# Patient Record
Sex: FEMALE | Race: WHITE | NOT HISPANIC OR LATINO | Employment: UNEMPLOYED | URBAN - METROPOLITAN AREA
[De-identification: names, ages, dates, MRNs, and addresses within clinical notes are randomized per-mention and may not be internally consistent; named-entity substitution may affect disease eponyms.]

---

## 2020-07-22 DIAGNOSIS — Z11.59 ENCOUNTER FOR SCREENING FOR OTHER VIRAL DISEASES: Primary | ICD-10-CM

## 2020-08-11 ENCOUNTER — HOSPITAL ENCOUNTER (OUTPATIENT)
Dept: LAB | Facility: CLINIC | Age: 40
Discharge: HOME | End: 2020-08-11
Attending: OPHTHALMOLOGY | Admitting: OPHTHALMOLOGY
Payer: MEDICAID

## 2020-08-11 DIAGNOSIS — Z11.59 ENCOUNTER FOR SCREENING FOR OTHER VIRAL DISEASES: ICD-10-CM

## 2020-08-11 PROCEDURE — U0003 INFECTIOUS AGENT DETECTION BY NUCLEIC ACID (DNA OR RNA); SEVERE ACUTE RESPIRATORY SYNDROME CORONAVIRUS 2 (SARS-COV-2) (CORONAVIRUS DISEASE [COVID-19]), AMPLIFIED PROBE TECHNIQUE, MAKING USE OF HIGH THROUGHPUT TECHNOLOGIES AS DESCRIBED BY CMS-2020-01-R: HCPCS | Performed by: OPHTHALMOLOGY

## 2020-08-11 NOTE — PROGRESS NOTES
"Covid-19 PCR test completed. Right NP swabbed. Tolerated well. \"After Your Covid-19 Test\" Handout given to patient.    "

## 2020-08-12 LAB
SARS-COV-2 RNA SPEC QL NAA+PROBE: NOT DETECTED
SPECIMEN SOURCE: NORMAL

## 2020-08-13 RX ORDER — VENLAFAXINE HYDROCHLORIDE 37.5 MG/1
37.5 TABLET, EXTENDED RELEASE ORAL DAILY
COMMUNITY

## 2020-08-13 RX ORDER — SIMVASTATIN 20 MG
20 TABLET ORAL AT BEDTIME
COMMUNITY

## 2020-08-13 RX ORDER — NYSTATIN 100000 [USP'U]/G
POWDER TOPICAL
COMMUNITY

## 2020-08-13 RX ORDER — LORATADINE 10 MG/1
10 TABLET ORAL DAILY
COMMUNITY

## 2020-08-13 RX ORDER — POLYETHYLENE GLYCOL 3350 17 G/17G
17 POWDER, FOR SOLUTION ORAL DAILY
COMMUNITY

## 2020-08-13 RX ORDER — FLUTICASONE PROPIONATE 50 MCG
1 SPRAY, SUSPENSION (ML) NASAL DAILY
COMMUNITY

## 2020-08-13 RX ORDER — BUPROPION HYDROCHLORIDE 150 MG/1
150 TABLET ORAL EVERY MORNING
COMMUNITY

## 2020-08-14 ENCOUNTER — ANESTHESIA EVENT (OUTPATIENT)
Dept: SURGERY | Facility: CLINIC | Age: 40
End: 2020-08-14
Payer: MEDICAID

## 2020-08-14 ENCOUNTER — HOSPITAL ENCOUNTER (OUTPATIENT)
Facility: CLINIC | Age: 40
Discharge: HOME | End: 2020-08-14
Attending: OPHTHALMOLOGY | Admitting: OPHTHALMOLOGY
Payer: MEDICAID

## 2020-08-14 ENCOUNTER — AMBULATORY - HEALTHEAST (OUTPATIENT)
Dept: OTHER | Facility: CLINIC | Age: 40
End: 2020-08-14

## 2020-08-14 ENCOUNTER — DOCUMENTATION ONLY (OUTPATIENT)
Dept: OTHER | Facility: CLINIC | Age: 40
End: 2020-08-14

## 2020-08-14 ENCOUNTER — ANESTHESIA (OUTPATIENT)
Dept: SURGERY | Facility: CLINIC | Age: 40
End: 2020-08-14
Payer: MEDICAID

## 2020-08-14 VITALS
TEMPERATURE: 96.7 F | HEART RATE: 100 BPM | RESPIRATION RATE: 16 BRPM | SYSTOLIC BLOOD PRESSURE: 149 MMHG | WEIGHT: 233.4 LBS | BODY MASS INDEX: 39.85 KG/M2 | DIASTOLIC BLOOD PRESSURE: 93 MMHG | OXYGEN SATURATION: 97 % | HEIGHT: 64 IN

## 2020-08-14 LAB — HCG SERPL QL: NEGATIVE

## 2020-08-14 PROCEDURE — 36000056 ZZH SURGERY LEVEL 3 1ST 30 MIN: Performed by: OPHTHALMOLOGY

## 2020-08-14 PROCEDURE — 71000027 ZZH RECOVERY PHASE 2 EACH 15 MINS: Performed by: OPHTHALMOLOGY

## 2020-08-14 PROCEDURE — 25000128 H RX IP 250 OP 636: Performed by: NURSE ANESTHETIST, CERTIFIED REGISTERED

## 2020-08-14 PROCEDURE — 71000014 ZZH RECOVERY PHASE 1 LEVEL 2 FIRST HR: Performed by: OPHTHALMOLOGY

## 2020-08-14 PROCEDURE — 25000125 ZZHC RX 250: Performed by: NURSE ANESTHETIST, CERTIFIED REGISTERED

## 2020-08-14 PROCEDURE — 25000125 ZZHC RX 250: Performed by: OPHTHALMOLOGY

## 2020-08-14 PROCEDURE — 71000015 ZZH RECOVERY PHASE 1 LEVEL 2 EA ADDTL HR: Performed by: OPHTHALMOLOGY

## 2020-08-14 PROCEDURE — 25800030 ZZH RX IP 258 OP 636: Performed by: NURSE ANESTHETIST, CERTIFIED REGISTERED

## 2020-08-14 PROCEDURE — 36000058 ZZH SURGERY LEVEL 3 EA 15 ADDTL MIN: Performed by: OPHTHALMOLOGY

## 2020-08-14 PROCEDURE — 37000008 ZZH ANESTHESIA TECHNICAL FEE, 1ST 30 MIN: Performed by: OPHTHALMOLOGY

## 2020-08-14 PROCEDURE — 84703 CHORIONIC GONADOTROPIN ASSAY: CPT | Performed by: ANESTHESIOLOGY

## 2020-08-14 PROCEDURE — 27210794 ZZH OR GENERAL SUPPLY STERILE: Performed by: OPHTHALMOLOGY

## 2020-08-14 PROCEDURE — 36415 COLL VENOUS BLD VENIPUNCTURE: CPT | Performed by: ANESTHESIOLOGY

## 2020-08-14 PROCEDURE — 40000170 ZZH STATISTIC PRE-PROCEDURE ASSESSMENT II: Performed by: OPHTHALMOLOGY

## 2020-08-14 PROCEDURE — 37000009 ZZH ANESTHESIA TECHNICAL FEE, EACH ADDTL 15 MIN: Performed by: OPHTHALMOLOGY

## 2020-08-14 RX ORDER — FENTANYL CITRATE 50 UG/ML
25-50 INJECTION, SOLUTION INTRAMUSCULAR; INTRAVENOUS
Status: CANCELLED | OUTPATIENT
Start: 2020-08-14

## 2020-08-14 RX ORDER — FENTANYL CITRATE 0.05 MG/ML
25-50 INJECTION, SOLUTION INTRAMUSCULAR; INTRAVENOUS
Status: DISCONTINUED | OUTPATIENT
Start: 2020-08-14 | End: 2020-08-17 | Stop reason: HOSPADM

## 2020-08-14 RX ORDER — LIDOCAINE HYDROCHLORIDE 20 MG/ML
INJECTION, SOLUTION INFILTRATION; PERINEURAL PRN
Status: DISCONTINUED | OUTPATIENT
Start: 2020-08-14 | End: 2020-08-14

## 2020-08-14 RX ORDER — ONDANSETRON 2 MG/ML
INJECTION INTRAMUSCULAR; INTRAVENOUS PRN
Status: DISCONTINUED | OUTPATIENT
Start: 2020-08-14 | End: 2020-08-14

## 2020-08-14 RX ORDER — TETRACAINE HYDROCHLORIDE 5 MG/ML
SOLUTION OPHTHALMIC
Status: DISCONTINUED
Start: 2020-08-14 | End: 2020-08-14 | Stop reason: HOSPADM

## 2020-08-14 RX ORDER — FENTANYL CITRATE 50 UG/ML
INJECTION, SOLUTION INTRAMUSCULAR; INTRAVENOUS PRN
Status: DISCONTINUED | OUTPATIENT
Start: 2020-08-14 | End: 2020-08-14

## 2020-08-14 RX ORDER — NALOXONE HYDROCHLORIDE 0.4 MG/ML
.1-.4 INJECTION, SOLUTION INTRAMUSCULAR; INTRAVENOUS; SUBCUTANEOUS
Status: DISCONTINUED | OUTPATIENT
Start: 2020-08-14 | End: 2020-08-15 | Stop reason: HOSPADM

## 2020-08-14 RX ORDER — ERYTHROMYCIN 5 MG/G
OINTMENT OPHTHALMIC PRN
Status: DISCONTINUED | OUTPATIENT
Start: 2020-08-14 | End: 2020-08-14 | Stop reason: HOSPADM

## 2020-08-14 RX ORDER — LIDOCAINE 40 MG/G
CREAM TOPICAL
Status: DISCONTINUED | OUTPATIENT
Start: 2020-08-14 | End: 2020-08-14 | Stop reason: HOSPADM

## 2020-08-14 RX ORDER — ONDANSETRON 4 MG/1
4 TABLET, ORALLY DISINTEGRATING ORAL EVERY 30 MIN PRN
Status: DISCONTINUED | OUTPATIENT
Start: 2020-08-14 | End: 2020-08-17 | Stop reason: HOSPADM

## 2020-08-14 RX ORDER — ERYTHROMYCIN 5 MG/G
OINTMENT OPHTHALMIC
Status: DISCONTINUED
Start: 2020-08-14 | End: 2020-08-14 | Stop reason: HOSPADM

## 2020-08-14 RX ORDER — LIDOCAINE HCL/EPINEPHRINE/PF 2%-1:200K
VIAL (ML) INJECTION PRN
Status: DISCONTINUED | OUTPATIENT
Start: 2020-08-14 | End: 2020-08-14 | Stop reason: HOSPADM

## 2020-08-14 RX ORDER — DEXAMETHASONE SODIUM PHOSPHATE 4 MG/ML
INJECTION, SOLUTION INTRA-ARTICULAR; INTRALESIONAL; INTRAMUSCULAR; INTRAVENOUS; SOFT TISSUE PRN
Status: DISCONTINUED | OUTPATIENT
Start: 2020-08-14 | End: 2020-08-14

## 2020-08-14 RX ORDER — PROPOFOL 10 MG/ML
INJECTION, EMULSION INTRAVENOUS CONTINUOUS PRN
Status: DISCONTINUED | OUTPATIENT
Start: 2020-08-14 | End: 2020-08-14

## 2020-08-14 RX ORDER — SODIUM CHLORIDE, SODIUM LACTATE, POTASSIUM CHLORIDE, CALCIUM CHLORIDE 600; 310; 30; 20 MG/100ML; MG/100ML; MG/100ML; MG/100ML
INJECTION, SOLUTION INTRAVENOUS CONTINUOUS PRN
Status: DISCONTINUED | OUTPATIENT
Start: 2020-08-14 | End: 2020-08-14

## 2020-08-14 RX ORDER — MEPERIDINE HYDROCHLORIDE 25 MG/ML
12.5 INJECTION INTRAMUSCULAR; INTRAVENOUS; SUBCUTANEOUS
Status: DISCONTINUED | OUTPATIENT
Start: 2020-08-14 | End: 2020-08-17 | Stop reason: HOSPADM

## 2020-08-14 RX ORDER — HYDROMORPHONE HYDROCHLORIDE 1 MG/ML
.3-.5 INJECTION, SOLUTION INTRAMUSCULAR; INTRAVENOUS; SUBCUTANEOUS EVERY 10 MIN PRN
Status: DISCONTINUED | OUTPATIENT
Start: 2020-08-14 | End: 2020-08-17 | Stop reason: HOSPADM

## 2020-08-14 RX ORDER — ONDANSETRON 2 MG/ML
4 INJECTION INTRAMUSCULAR; INTRAVENOUS EVERY 30 MIN PRN
Status: DISCONTINUED | OUTPATIENT
Start: 2020-08-14 | End: 2020-08-17 | Stop reason: HOSPADM

## 2020-08-14 RX ORDER — PROPOFOL 10 MG/ML
INJECTION, EMULSION INTRAVENOUS PRN
Status: DISCONTINUED | OUTPATIENT
Start: 2020-08-14 | End: 2020-08-14

## 2020-08-14 RX ORDER — LIDOCAINE HCL/EPINEPHRINE/PF 2%-1:200K
VIAL (ML) INJECTION
Status: DISCONTINUED
Start: 2020-08-14 | End: 2020-08-14 | Stop reason: HOSPADM

## 2020-08-14 RX ORDER — SODIUM CHLORIDE, SODIUM LACTATE, POTASSIUM CHLORIDE, CALCIUM CHLORIDE 600; 310; 30; 20 MG/100ML; MG/100ML; MG/100ML; MG/100ML
INJECTION, SOLUTION INTRAVENOUS CONTINUOUS
Status: DISCONTINUED | OUTPATIENT
Start: 2020-08-14 | End: 2020-08-17 | Stop reason: HOSPADM

## 2020-08-14 RX ADMIN — DEXAMETHASONE SODIUM PHOSPHATE 4 MG: 4 INJECTION, SOLUTION INTRA-ARTICULAR; INTRALESIONAL; INTRAMUSCULAR; INTRAVENOUS; SOFT TISSUE at 08:06

## 2020-08-14 RX ADMIN — FENTANYL CITRATE 50 MCG: 50 INJECTION, SOLUTION INTRAMUSCULAR; INTRAVENOUS at 07:50

## 2020-08-14 RX ADMIN — FENTANYL CITRATE 25 MCG: 50 INJECTION, SOLUTION INTRAMUSCULAR; INTRAVENOUS at 09:10

## 2020-08-14 RX ADMIN — LIDOCAINE HYDROCHLORIDE 60 MG: 20 INJECTION, SOLUTION INFILTRATION; PERINEURAL at 07:50

## 2020-08-14 RX ADMIN — FENTANYL CITRATE 25 MCG: 50 INJECTION, SOLUTION INTRAMUSCULAR; INTRAVENOUS at 09:40

## 2020-08-14 RX ADMIN — PROPOFOL 200 MG: 10 INJECTION, EMULSION INTRAVENOUS at 07:50

## 2020-08-14 RX ADMIN — MIDAZOLAM 1 MG: 1 INJECTION INTRAMUSCULAR; INTRAVENOUS at 07:46

## 2020-08-14 RX ADMIN — SODIUM CHLORIDE, POTASSIUM CHLORIDE, SODIUM LACTATE AND CALCIUM CHLORIDE: 600; 310; 30; 20 INJECTION, SOLUTION INTRAVENOUS at 07:45

## 2020-08-14 RX ADMIN — PHENYLEPHRINE HYDROCHLORIDE 150 MCG: 10 INJECTION INTRAVENOUS at 07:54

## 2020-08-14 RX ADMIN — PROPOFOL 180 MCG/KG/MIN: 10 INJECTION, EMULSION INTRAVENOUS at 07:54

## 2020-08-14 RX ADMIN — ONDANSETRON 4 MG: 2 INJECTION INTRAMUSCULAR; INTRAVENOUS at 09:12

## 2020-08-14 ASSESSMENT — LIFESTYLE VARIABLES: TOBACCO_USE: 0

## 2020-08-14 ASSESSMENT — ENCOUNTER SYMPTOMS: DYSRHYTHMIAS: 0

## 2020-08-14 ASSESSMENT — MIFFLIN-ST. JEOR: SCORE: 1708.7

## 2020-08-14 NOTE — OP NOTE
Procedure Date: 08/14/2020      PROCEDURES:   1.  Repair of bilateral upper eyelid ptosis.   2.  Repair of bilateral upper eyelid mechanical ptosis.   3.  Bilateral upper eyelid wedge excision.   4.  Repair of bilateral eyebrow ptosis.    5.  Repair of bilateral lower eyelid ectropion.       PREOPERATIVE DIAGNOSES:    1.  Bilateral upper eyelid ptosis.   2.  Bilateral upper eyelid mechanical ptosis.    3.  Bilateral eyebrow ptosis.    4.  Bilateral upper eyelid floppy eyelid syndrome.    5.  Bilateral lower eyelid ectropion.      SURGEON:  Logan Lopez MD.      ANESTHESIA:  Local monitored.      COMPLICATIONS:  None.      INDICATION FOR SURGERY:  The patient has myotonic dystrophy and had severe ptosis and mechanical ptosis of both upper eyelids.  Additionally, the patient had marked eyebrow ptosis with the eyebrows resting well below the orbital rim.  Patient had horizontal laxity of both the upper and lower eyelids, resulting in chronic tearing.  The patient presents for surgery because of visual obstruction due to her ptosis, mechanical ptosis and eyebrow ptosis and due to chronic tearing due to her floppy eyelid syndrome and bilateral lower eyelid ectropion.      PROCEDURE:  The patient was taken to the operating room and received a local block of 2% Lidocaine without epinephrine.  The block was administered transcutaneously along the eyelid crease and the planned incision line was outlined with a marking pen.  The patient was then prepped and draped in the usual sterile fashion.  The incision was then made along the previously marked area.  A moderate amount of skin was excised taking care to allow adequate closure and avoid lagophthalmos. Lauren scissors were then used to develop a plane over the septum.  The septum was opened and a small amount of orbital fat was removed.  Levator aponeurosis was then disinserted from the tarsus and a plane was developed between the aponeurosis and the underlying tarsus and  Hammonds's muscle.  A 5-0 Mersilene suture was then passed through the tarsus in a lamellar fashion and externalized through the levator aponeurosis.  This was tied off in a temporary fashion and the patient was asked to sit up and the eyelids height and contour evaluated.  This was repeated until a satisfactory height and contour were obtained, and two additional sutures were placed lateral to the initial suture.  This resulted in a normal contour and height to the bilateral upper eyelids.  Attempted overcorrection of 0.5 mm was obtained.  The redundant levator aponeurosis was then excised and the skin was then closed with running 6-0 fast absorbing suture.  The patient left the operating room in stable condition.      The patient was taken into the operating room and the planned excision was outlined along the upper aspect of both eyebrows. The eyebrows were then injected with 2% lidocaine with 1:100,000 epinephrine. The patient was prepped and draped in the usual sterile fashion. An ellipse of skin and subcutaneous tissue was excised with a #15 blade and cutting cautery. Care was taken to allow for good eyelid closure. Hemostasis was obtained with the [facility] cautery. Deep tissues were then closed with interrupted 5-0 Vicryl suture. The skin was closed with running 6-0 nylon suture, taking care to israel the skin margins. The patient left the operating room in stable condition.      A canthotomy was performed and the inferior limb of lateral canthal tendon was severed.  The tarsal strip was shortened and a small strip of eyelashes was excised.  A tongue of tarsus was developed and then the orbital rim was exposed with cutting cautery.  A double armed 5-0 Prolene suture was then used to suspend the tarsus from the internal aspect of the orbital rim.  This was tied off in a permanent fashion and the overlying skin was closed with an interrupted 6-0 fast absorbing gut suture.  The opposite side was done in an  identical manner.  At the end of the procedure the patient had a normal contour to both lower eyelids.  The patient left the operating room in stable condition.      During the upper eyelid surgery, a full thickness wedge was removed in the lateral aspect of both upper eyelids measuring approximately 30% of each upper eyelid.  This was closed using 5-0 Vicryl suture at the level of the tarsal plate and then closing the eyelid margin with 6-0 chromic at the gray line, lash line and pretarsal skin.  A similar 30% excision was performed on each side.  The patient tolerated the procedure well.         MARGARITA CASEY MD             D: 2020   T: 2020   MT: DAVE      Name:     VALENTINE LUJAN   MRN:      [id number]        Account:        [id number]   :      1980           Procedure Date: 2020      Document: [id number]

## 2020-08-14 NOTE — OP NOTE
[facility] Ophthalmology Brief Operative Note    Pre-operative diagnosis: Mechanical ptosis of eyelid of both eyes [H02.413]  Bilateral upper eyelid ptosis  Bilateral upper eyelid floppy eyelid syndrome  Bilateral lower eyelid ectropion  Bilateral eyebrow ptosis   Post-operative diagnosis: Same   Procedure: Repair bilateral upper eyelid ptosis  Repair bilateral upper eyelid mechanical ptosis  Bilateral upper eyelid wedge excision  Bilateral lower eyelid ectropion repair  Bilateral eyebrow ptosis repair   Surgeon: Logan Lopez MD       Anesthesia: MAC   Estimated blood loss: Less than 10 ml   Total IV fluids: (See anesthesia record)   Blood transfusion: No transfusion was given during surgery   Total urine output: (See anesthesia record)   Drains: None   Specimens: None   Implants: None   Findings: See operative note   Complications: None   Condition: Stable   Comments: See dictated operative report for full details     Logan Lopez MD

## 2020-08-14 NOTE — ANESTHESIA PREPROCEDURE EVALUATION
Anesthesia Pre-Procedure Evaluation    Patient: Jacquelin Buckley   MRN: [id number] : 1980          Preoperative Diagnosis: Mechanical ptosis of eyelid of both eyes [H02.413]    Procedure(s):  BILATERAL UPPER LID PTOSIS AND  MECHANICAL PTOSIS REPAIR  BILATERAL BROW PTOSIS REPAIR  BILATERAL LOWER LID ECTROPION REPAIR    Past Medical History:   Diagnosis Date     Cerebral palsy (H)      Depressive disorder      Dermatochalasis of both eyelids      Eczema      Muscular dystrophy (H)      Pseudophakia      Ptosis      Sleep apnea      Past Surgical History:   Procedure Laterality Date     CHOLECYSTECTOMY       EYE SURGERY      cataract      ORTHOPEDIC SURGERY      Heel cord surgery        Anesthesia Evaluation     .             ROS/MED HX    ENT/Pulmonary:     (+)sleep apnea, , . .   (-) tobacco use and asthma   Neurologic:     (+)other neuro cerebral palsy; uses leg braces    Cardiovascular:     (+) Dyslipidemia, ----. : . . . :. .      (-) hypertension and arrhythmias   METS/Exercise Tolerance:     Hematologic:         Musculoskeletal:   (+) Muscular Dystrophy,  -       GI/Hepatic:        (-) GERD and liver disease   Renal/Genitourinary:      (-) renal disease   Endo:     (+) Obesity, .   (-) Type II DM and thyroid disease   Psychiatric:     (+) psychiatric history depression      Infectious Disease:         Malignancy:         Other:                          Physical Exam  Normal systems: dental    Airway   Mallampati: II  TM distance: >3 FB  Neck ROM: full    Dental     Cardiovascular   Rhythm and rate: regular      Pulmonary             No results found for: WBC, HGB, HCT, PLT, CRP, SED, NA, POTASSIUM, CHLORIDE, CO2, BUN, CR, GLC, BIMAL, PHOS, MAG, ALBUMIN, PROTTOTAL, ALT, AST, GGT, ALKPHOS, BILITOTAL, BILIDIRECT, LIPASE, AMYLASE, HAYDEN, PTT, INR, FIBR, TSH, T4, T3, HCG, HCGS, CKTOTAL, CKMB, TROPN    Preop Vitals  BP Readings from Last 3 Encounters:   20 130/84    Pulse Readings from Last 3 Encounters:  "  08/14/20 100      Resp Readings from Last 3 Encounters:   08/14/20 16    SpO2 Readings from Last 3 Encounters:   08/14/20 94%      Temp Readings from Last 1 Encounters:   08/14/20 36.3  C (97.3  F) (Temporal)    Ht Readings from Last 1 Encounters:   08/14/20 1.626 m (5' 4\")      Wt Readings from Last 1 Encounters:   08/14/20 105.9 kg (233 lb 6.4 oz)    Estimated body mass index is 40.06 kg/m  as calculated from the following:    Height as of this encounter: 1.626 m (5' 4\").    Weight as of this encounter: 105.9 kg (233 lb 6.4 oz).       Anesthesia Plan      History & Physical Review  History and physical reviewed and following examination; no interval change.    ASA Status:  3 .    NPO Status:  > 8 hours    Plan for General (LMA) with Propofol induction. Maintenance will be TIVA.    PONV prophylaxis:  Ondansetron (or other 5HT-3) and Dexamethasone or Solumedrol  TIVA with hx of muscular dystrophy  No succinylcholine        Postoperative Care  Postoperative pain management:  IV analgesics and Oral pain medications.      Consents  Anesthetic plan, risks, benefits and alternatives discussed with:  Patient..                 Scott Prater MD  "

## 2020-08-14 NOTE — DISCHARGE INSTRUCTIONS
Same Day Surgery Discharge Instructions for  Sedation and General Anesthesia       It's not unusual to feel dizzy, light-headed or faint for up to 24 hours after surgery or while taking pain medication.  If you have these symptoms: sit for a few minutes before standing and have someone assist you when you get up to walk or use the bathroom.      You should rest and relax for the next 24 hours. We recommend you make arrangements to have an adult stay with you for at least 24 hours after your discharge.  Avoid hazardous and strenuous activity.      DO NOT DRIVE any vehicle or operate mechanical equipment for 24 hours following the end of your surgery.  Even though you may feel normal, your reactions may be affected by the medication you have received.      Do not drink alcoholic beverages for 24 hours following surgery.       Slowly progress to your regular diet as you feel able. It's not unusual to feel nauseated and/or vomit after receiving anesthesia.  If you develop these symptoms, drink clear liquids (apple juice, ginger ale, broth, 7-up, etc. ) until you feel better.  If your nausea and vomiting persists for 24 hours, please notify your surgeon.        All narcotic pain medications, along with inactivity and anesthesia, can cause constipation. Drinking plenty of liquids and increasing fiber intake will help.      For any questions of a medical nature, call your surgeon.      Do not make important decisions for 24 hours.      If you had general anesthesia, you may have a sore throat for a couple of days related to the breathing tube used during surgery.  You may use Cepacol lozenges to help with this discomfort.  If it worsens or if you develop a fever, contact your surgeon.       If you feel your pain is not well managed with the pain medications prescribed by your surgeon, please contact your surgeon's office to let them know so they can address your concerns.       CoVid 19 Information    We want to give you  information regarding Covid. Please consult your primary care provider with any questions you might have.     Patient who have symptoms (cough, fever, or shortness of breath), need to isolate for 7 days from when symptoms started OR 72 hours after fever resolves (without fever reducing medications) AND improvement of respiratory symptoms (whichever is longer).      Isolate yourself at home (in own room/own bathroom if possible)    Do Not allow any visitors    Do Not go to work or school    Do Not go to J[employer],  centers, shopping, or other public places.    Do Not shake hands.    Avoid close and intimate contact with others (hugging, kissing).    Follow CDC recommendations for household cleaning of frequently touched services.     After the initial 7 days, continue to isolate yourself from household members as much as possible. To continue decrease the risk of community spread and exposure, you and any members of your household should limit activities in public for 14 days after starting home isolation.     You can reference the following CDC link for helpful home isolation/care tips:  https://www.cdc.gov/coronavirus/2019-ncov/downloads/10Things.pdf    Protect Others:    Cover Your Mouth and Nose with a mask, disposable tissue or wash cloth to avoid spreading germs to others.    Wash your hands and face frequently with soap and water    Call Your Primary Doctor If: Breathing difficulty develops or you become worse.    For more information about COVID19 and options for caring for yourself at home, please visit the CDC website at https://www.cdc.gov/coronavirus/2019-ncov/about/steps-when-sick.html  For more options for care at [facility], please visit our website at [email or web address]    [facility]   Eyelid/Orbital Surgery Discharge Instructions    Logan Lopez M.D..     ICE COMPRESSES  Immediately following surgery, you should begin to apply ice  compresses.  Apply a cold gel pack or wrap a clean washcloth around a cup of crushed ice in a plastic bag (a bag of frozen peas also works well) and hold the cold compresses directly against the closed eyelid (s).Apply cold pack for a minimum of six times daily for no longer than 15 minutes at a time. Continue cold compresses every day until the bruising and swelling begin to subside.  This can vary for each patient, but three 3 days may be common.    HOT COMPRESSES  After your swelling and bruising have begun to subside, hot compresses should be applied.  Take a clean washcloth and wring it out in hot water (as warm as you can tolerate comfortably).  Hold this warm compress against the closed eyelid(s) at least six times per day for 15 minutes.  This should be continued for about two weeks.    OINTMENT  You may be given some ointment when you leave the hospital.  Apply this ointment to the suture line(s) twice a day, 1/4 inch into the eye at bedtime for 7 days.  Expect some blurring of vision from the ointment.     ACTIVITY  Avoid heavy lifting or vigorous exercise for one week after surgery.  You may resume regular activities as tolerated.  You may shower and wash your hair on the day after surgery; be careful to avoid getting shampoo in your eyes. While your eyes are still swelling, it is recommended you sleep on your back and elevate your head with 2-3 pillows.    MEDICATION  If the doctor has given you some medications to take after surgery, please take these according to the instructions on the bottle.  Pain medications may make you drowsy so do not drive, operate heavy machinery, or use alcohol while taking it.  When you feel that you do not need the prescription pain medication, you may substitute Extra Strength Tylenol for mild pain by also following the directions on the bottle.    If you were taking Coumadin (warfarin) prior to your surgery, you may resume this medication with your next scheduled  dose.    WHAT TO EXPECT  You should expect some slight oozing of blood from the incision site over the first two to three days after surgery.  Swelling and bruising will occur for one to two weeks or longer.  You may also experience itching and tearing during the first several weeks after surgery.  This is part of the normal healing process    QUESTIONS  Please feel free to contact the office, should you have any questions that are not answered above.  The phone number is [phone].  Please call immediately if you are unable to establish vision in the operative eye, you are experiencing heavy bleeding that will not stop with gentle pressure or you have any signs of an infection (greenish/yellow discharge or progressive redness).    [facility]  [place] [address]  [place], [place] [address]  [phone]

## 2020-08-14 NOTE — ANESTHESIA POSTPROCEDURE EVALUATION
Patient: Jacquelin BOUDREAUX Scheid    Procedure(s):  BILATERAL UPPER LID PTOSIS AND  MECHANICAL PTOSIS REPAIR  BILATERAL BROW PTOSIS REPAIR  BILATERAL LOWER LID ECTROPION REPAIR  Wedge resection eyelid    Diagnosis:Mechanical ptosis of eyelid of both eyes [H02.413]  Diagnosis Additional Information: No value filed.    Anesthesia Type:  General    Note:  Anesthesia Post Evaluation    Patient location during evaluation: PACU  Patient participation: Able to fully participate in evaluation  Level of consciousness: awake  Pain management: adequate  Airway patency: patent  Cardiovascular status: acceptable  Respiratory status: acceptable  Hydration status: acceptable  PONV: none             Last vitals:  Vitals:    08/14/20 1015 08/14/20 1030 08/14/20 1045   BP: (!) 142/117 (!) 155/110 (!) 150/122   Pulse: 93 101 97   Resp: 13 17 12   Temp:      SpO2: 94% 93% 98%         Electronically Signed By: Jorje Cervantes MD  August 14, 2020  11:22 AM

## 2020-08-14 NOTE — ANESTHESIA CARE TRANSFER NOTE
Patient: Jacquelin BOUDREAUX Scheid    Procedure(s):  BILATERAL UPPER LID PTOSIS AND  MECHANICAL PTOSIS REPAIR  BILATERAL BROW PTOSIS REPAIR  BILATERAL LOWER LID ECTROPION REPAIR  Wedge resection eyelid    Diagnosis: Mechanical ptosis of eyelid of both eyes [H02.413]  Diagnosis Additional Information: No value filed.    Anesthesia Type:   General     Note:  Airway :Nasal Cannula  Patient transferred to:PACU  Comments: To [facility] PACU: Arouses easily, good airway, 02 NC, VSS  Report to RNHandoff Report: Identifed the Patient, Identified the Reponsible Provider, Reviewed the pertinent medical history, Discussed the surgical course, Reviewed Intra-OP anesthesia mangement and issues during anesthesia, Set expectations for post-procedure period and Allowed opportunity for questions and acknowledgement of understanding      Vitals: (Last set prior to Anesthesia Care Transfer)    CRNA VITALS  8/14/2020 0935 - 8/14/2020 1005      8/14/2020             Resp Rate (observed):  (!) 1    Resp Rate (set):  10                Electronically Signed By: EDUARDA Hartman CRNA  August 14, 2020  10:05 AM

## 2020-08-14 NOTE — OR NURSING
Dr. Jorje Cervantes notified patient is turning red, blue lips, 02saturation to high 70s.  Venturi mask placed on patient at 8 liters.  Dr. Cervantes to bedside.  Ha been responsive prior to this moment.  Responds to Dr. Cervantes one minute after he arrived at bedside answering questions with one word answers.  Eyes tearing continuously.  Patient is now asking for her dad.  Patient's father Nima brought to bedside.  Patient is aware he is there now.  02 sat is now 96% on 5 liters.  Will continue to wean 02.

## 2022-01-25 ENCOUNTER — TELEPHONE (OUTPATIENT)
Dept: NEUROLOGY | Facility: CLINIC | Age: 42
End: 2022-01-25
Payer: MEDICAID

## 2022-01-25 NOTE — TELEPHONE ENCOUNTER
[facility] Call Center    Phone Message    May a detailed message be left on voicemail: yes     Reason for Call: Other: Please call patient group home to assist with changing in person visit to a video visit, please call Stefanie at [phone] to assist.     Action Taken: Other: [facility]    Travel Screening: Not Applicable

## 2022-01-25 NOTE — TELEPHONE ENCOUNTER
Left voice mail for Stefanie at group home. Dr Colmenares would like to do an in clinic visit with Jacquelin. This will be their first visit together. Instructed Stefanie to call scheduling if the appointment needs to be a different day.

## 2022-09-16 ENCOUNTER — OFFICE VISIT (OUTPATIENT)
Dept: NEUROLOGY | Facility: CLINIC | Age: 42
End: 2022-09-16
Payer: MEDICAID

## 2022-09-16 VITALS
DIASTOLIC BLOOD PRESSURE: 74 MMHG | BODY MASS INDEX: 37.76 KG/M2 | HEART RATE: 85 BPM | SYSTOLIC BLOOD PRESSURE: 119 MMHG | WEIGHT: 220 LBS

## 2022-09-16 DIAGNOSIS — G71.11 MYOTONIC MUSCULAR DYSTROPHY (H): Primary | ICD-10-CM

## 2022-09-16 PROCEDURE — 99203 OFFICE O/P NEW LOW 30 MIN: CPT | Performed by: PSYCHIATRY & NEUROLOGY

## 2022-09-16 RX ORDER — METFORMIN HCL 500 MG
TABLET, EXTENDED RELEASE 24 HR ORAL
COMMUNITY
Start: 2022-09-09 | End: 2022-10-09

## 2022-09-16 NOTE — LETTER
9/16/2022         RE: Jacquelin Buckley  [address] [place] [place] [address]        Dear Colleague,    Thank you for referring your patient, Jacquelin Buckley, to the [facility] [place]. Please see a copy of my visit note below.    INITIAL NEUROLOGY CONSULTATION - Transfer of Care    DATE OF VISIT: 9/16/2022  MRN: [id number]  PATIENT NAME: Jacquelin Buckley  YOB: 1980    REFERRING PROVIDER: Referred Self    No chief complaint on file.      SUBJECTIVE:                                                      HPI:   Jacquelin Buckley is a 41 year old female here to establish care for myotonic dystrophy.  She is accompanied by a caregiver from her home.  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  She has history of cataract removal and follows with the eye doctor regularly.  She recently had an eyelid lift bilaterally, but did not do well coming out of anesthesia.  She would like to avoid any further surgeries if possible.  She was recently diagnosed with fatty liver for which she has been told she needs to lose weight.  She follows with a GI specialist.  Jacquelin denies any abdominal pain.      She has been walking 10 minutes a day now and will be starting physical therapy next month.  She is also working on eating healthier.  She does have difficulties going up and down stairs so she has a chairlift now.  She says she has new braces.  She was also diagnosed with some arthritis in the knees so she is on medication for this (glucosamine?).  No problems with swallowing. Recent breathing test was reportedly abnormal per caregiver.  Because of this she will be doing an updated sleep study and possibly transition from CPAP to BiPAP.    She continues on bupropion and Effexor for mood.  She does continue to follow with cardiology.     Past Medical History:   Diagnosis Date     Cerebral palsy (H)      Depressive disorder      Dermatochalasis of both eyelids      Eczema       Muscular dystrophy (H)      Pseudophakia      Ptosis      Sleep apnea      Past Surgical History:   Procedure Laterality Date     CHOLECYSTECTOMY       EYE SURGERY      cataract      ORTHOPEDIC SURGERY      Heel cord surgery      REPAIR ECTROPION BILATERAL Bilateral 8/14/2020    Procedure: BILATERAL LOWER LID ECTROPION REPAIR;  Surgeon: Logan Lopez MD;  Location: [facility] OR     REPAIR PTOSIS BILATERAL Bilateral 8/14/2020    Procedure: BILATERAL UPPER LID PTOSIS AND  MECHANICAL PTOSIS REPAIR;  Surgeon: Logan Lopez MD;  Location: [facility] OR     REPAIR PTOSIS BROW BILATERAL Bilateral 8/14/2020    Procedure: BILATERAL BROW PTOSIS REPAIR;  Surgeon: Logan Lopez MD;  Location: [facility]     WEDGE RESECTION EYELID Bilateral 8/14/2020    Procedure: Wedge resection eyelid;  Surgeon: Logan Lopez MD;  Location: [facility] OR       buPROPion (WELLBUTRIN XL) 150 MG 24 hr tablet, Take 150 mg by mouth every morning  fluticasone (FLONASE) 50 MCG/ACT nasal spray, Spray 1 spray into both nostrils daily  loratadine (CLARITIN) 10 MG tablet, Take 10 mg by mouth daily  metFORMIN (GLUCOPHAGE XR) 500 MG 24 hr tablet,   METHYLCELLULOSE OP,   Misc Natural Products (GLUCOSAMINE-CHONDROITIN SULF PO),   nystatin (MYCOSTATIN) 466026 UNIT/GM external powder,   polyethylene glycol (MIRALAX) 17 g packet, Take 17 g by mouth daily  simvastatin (ZOCOR) 20 MG tablet, Take 20 mg by mouth At Bedtime  venlafaxine (EFFEXOR-ER) 37.5 MG 24 hr tablet, Take 37.5 mg by mouth daily    No current facility-administered medications on file prior to visit.    No Known Allergies      Social History     Tobacco Use     Smoking status: Never Smoker     Smokeless tobacco: Never Used   Substance Use Topics     Alcohol use: Not Currently     Drug use: Never       REVIEW OF SYSTEMS:                                                      10-point review of systems is negative except as mentioned above in HPI.     EXAM:                                                       Physical Exam:   Vitals: /74   Pulse 85   Wt 99.8 kg (220 lb)   BMI 37.76 kg/m    BMI= Body mass index is 37.76 kg/m .  GENERAL: NAD.  HEENT: NC/AT.   CV: RRR. S1, S2.   NECK: No bruits.  PULM: Non-labored breathing.   Neurologic:  MENTAL STATUS: Alert, attentive. Speech is slightly dysarthric.  Good comprehension.  Fair concentration.  CRANIAL NERVES: Discs are difficult to visualize as she has very little space between her eyelids.  Visual fields intact to confrontation. Pupils equally, round and reactive to light. Facial plegia. EOM appear full. Hearing intact to conversation. Sternocleidomastoids and trapezius strength intact. Palate moves symmetrically. Tongue midline.  MOTOR: 5/5 in proximal and distal muscle groups of upper and lower extremities except for distally with AFOs in place.  She does have trouble lifting her leg enough to step up onto the exam bed. Tone and bulk normal.  Mild  myotonia.  DTRs: Intact and symmetric in biceps, BR, patellae, 1+.  Absent ankle jerks.  Babinski down-going bilaterally.   SENSATION: Normal light touch throughout.  COORDINATION: Slight tremor, otherwise normal finger nose finger. Finger tapping normal.  STATION AND GAIT: Romberg negative.  Casual gait is slightly broad-based.  Right hand-dominant.    ASSESSMENT and PLAN:                                                      Assessment:     ICD-10-CM    1. Myotonic muscular dystrophy (H)  G71.11         Ms. Buckley is a pleasant 41-year-old woman here to reestablish care in neurology for myotonic dystrophy.  She does not seem to be having any cardiac issues.  She does have some sleep/breathing issues which are being addressed.  She also has a new diagnosis of fatty liver for which she is working on losing weight.  She follows with ophthalmology/optometry regularly.  We will continue to follow annually unless new concerns arise in the meantime.  Jacquelin and her caregiver expressed understanding and  agreement with the plan    Plan:  Keep up the good work with your exercise and diet plan!  We will plan to follow-up again in 1 year.  Please let us know if any concerns arise in the meantime.    Total Time: 40 minutes were spent with the patient and in chart review/documentation (as described above in Assessment and Plan) /coordinating the care on date of service.    Esme Colmenares MD  Neurology    CC: Kyleigh Marquez MD    Dragon software used in the dictation of this note.        Again, thank you for allowing me to participate in the care of your patient.        Sincerely,        Esme Colmenares MD

## 2022-09-16 NOTE — PROGRESS NOTES
INITIAL NEUROLOGY CONSULTATION - Transfer of Care    DATE OF VISIT: 9/16/2022  MRN: [id number]  PATIENT NAME: Jacquelin Buckley  YOB: 1980    REFERRING PROVIDER: Referred Self    No chief complaint on file.      SUBJECTIVE:                                                      HPI:   Jacquelin Buckley is a 41 year old female here to establish care for myotonic dystrophy.  She is accompanied by a caregiver from her home.  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  She has history of cataract removal and follows with the eye doctor regularly.  She recently had an eyelid lift bilaterally, but did not do well coming out of anesthesia.  She would like to avoid any further surgeries if possible.  She was recently diagnosed with fatty liver for which she has been told she needs to lose weight.  She follows with a GI specialist.  Jacquelin denies any abdominal pain.      She has been walking 10 minutes a day now and will be starting physical therapy next month.  She is also working on eating healthier.  She does have difficulties going up and down stairs so she has a chairlift now.  She says she has new braces.  She was also diagnosed with some arthritis in the knees so she is on medication for this (glucosamine?).  No problems with swallowing. Recent breathing test was reportedly abnormal per caregiver.  Because of this she will be doing an updated sleep study and possibly transition from CPAP to BiPAP.    She continues on bupropion and Effexor for mood.  She does continue to follow with cardiology.     Past Medical History:   Diagnosis Date     Cerebral palsy (H)      Depressive disorder      Dermatochalasis of both eyelids      Eczema      Muscular dystrophy (H)      Pseudophakia      Ptosis      Sleep apnea      Past Surgical History:   Procedure Laterality Date     CHOLECYSTECTOMY       EYE SURGERY      cataract      ORTHOPEDIC SURGERY      Heel cord surgery      REPAIR ECTROPION  BILATERAL Bilateral 8/14/2020    Procedure: BILATERAL LOWER LID ECTROPION REPAIR;  Surgeon: Logan Lopez MD;  Location: [facility]     REPAIR PTOSIS BILATERAL Bilateral 8/14/2020    Procedure: BILATERAL UPPER LID PTOSIS AND  MECHANICAL PTOSIS REPAIR;  Surgeon: Logan Lopez MD;  Location: [facility]     REPAIR PTOSIS BROW BILATERAL Bilateral 8/14/2020    Procedure: BILATERAL BROW PTOSIS REPAIR;  Surgeon: Logan Lopez MD;  Location: [facility]     WEDGE RESECTION EYELID Bilateral 8/14/2020    Procedure: Wedge resection eyelid;  Surgeon: Logan Lopez MD;  Location: [facility]       buPROPion (WELLBUTRIN XL) 150 MG 24 hr tablet, Take 150 mg by mouth every morning  fluticasone (FLONASE) 50 MCG/ACT nasal spray, Spray 1 spray into both nostrils daily  loratadine (CLARITIN) 10 MG tablet, Take 10 mg by mouth daily  metFORMIN (GLUCOPHAGE XR) 500 MG 24 hr tablet,   METHYLCELLULOSE OP,   Misc Natural Products (GLUCOSAMINE-CHONDROITIN SULF PO),   nystatin (MYCOSTATIN) 500564 UNIT/GM external powder,   polyethylene glycol (MIRALAX) 17 g packet, Take 17 g by mouth daily  simvastatin (ZOCOR) 20 MG tablet, Take 20 mg by mouth At Bedtime  venlafaxine (EFFEXOR-ER) 37.5 MG 24 hr tablet, Take 37.5 mg by mouth daily    No current facility-administered medications on file prior to visit.    No Known Allergies      Social History     Tobacco Use     Smoking status: Never Smoker     Smokeless tobacco: Never Used   Substance Use Topics     Alcohol use: Not Currently     Drug use: Never       REVIEW OF SYSTEMS:                                                      10-point review of systems is negative except as mentioned above in HPI.     EXAM:                                                      Physical Exam:   Vitals: /74   Pulse 85   Wt 99.8 kg (220 lb)   BMI 37.76 kg/m    BMI= Body mass index is 37.76 kg/m .  GENERAL: NAD.  HEENT: NC/AT.   CV: RRR. S1, S2.   NECK: No bruits.  PULM: Non-labored breathing.    Neurologic:  MENTAL STATUS: Alert, attentive. Speech is slightly dysarthric.  Good comprehension.  Fair concentration.  CRANIAL NERVES: Discs are difficult to visualize as she has very little space between her eyelids.  Visual fields intact to confrontation. Pupils equally, round and reactive to light. Facial plegia. EOM appear full. Hearing intact to conversation. Sternocleidomastoids and trapezius strength intact. Palate moves symmetrically. Tongue midline.  MOTOR: 5/5 in proximal and distal muscle groups of upper and lower extremities except for distally with AFOs in place.  She does have trouble lifting her leg enough to step up onto the exam bed. Tone and bulk normal.  Mild  myotonia.  DTRs: Intact and symmetric in biceps, BR, patellae, 1+.  Absent ankle jerks.  Babinski down-going bilaterally.   SENSATION: Normal light touch throughout.  COORDINATION: Slight tremor, otherwise normal finger nose finger. Finger tapping normal.  STATION AND GAIT: Romberg negative.  Casual gait is slightly broad-based.  Right hand-dominant.    ASSESSMENT and PLAN:                                                      Assessment:     ICD-10-CM    1. Myotonic muscular dystrophy (H)  G71.11         Ms. Buckley is a pleasant 41-year-old woman here to reestablish care in neurology for myotonic dystrophy.  She does not seem to be having any cardiac issues.  She does have some sleep/breathing issues which are being addressed.  She also has a new diagnosis of fatty liver for which she is working on losing weight.  She follows with ophthalmology/optometry regularly.  We will continue to follow annually unless new concerns arise in the meantime.  Jacquelin and her caregiver expressed understanding and agreement with the plan    Plan:  Keep up the good work with your exercise and diet plan!  We will plan to follow-up again in 1 year.  Please let us know if any concerns arise in the meantime.    Total Time: 40 minutes were spent with the  patient and in chart review/documentation (as described above in Assessment and Plan) /coordinating the care on date of service.    Esme Colmenares MD  Neurology    CC: MD Betito Isidro software used in the dictation of this note.

## 2024-10-09 ENCOUNTER — OFFICE VISIT (OUTPATIENT)
Dept: NEUROLOGY | Facility: CLINIC | Age: 44
End: 2024-10-09
Payer: MEDICAID

## 2024-10-09 VITALS
DIASTOLIC BLOOD PRESSURE: 86 MMHG | WEIGHT: 215 LBS | BODY MASS INDEX: 36.9 KG/M2 | SYSTOLIC BLOOD PRESSURE: 127 MMHG | HEART RATE: 89 BPM

## 2024-10-09 DIAGNOSIS — G71.09 HEREDITARY PROGRESSIVE MUSCULAR DYSTROPHY (H): Primary | ICD-10-CM

## 2024-10-09 PROCEDURE — 99213 OFFICE O/P EST LOW 20 MIN: CPT | Performed by: PSYCHIATRY & NEUROLOGY

## 2024-10-09 NOTE — PATIENT INSTRUCTIONS
Plan:  I have requested home physical therapy to help you with strengthening, balance/stability.  Please try to wear your braces whenever you are up on your feet and make sure that you are using your walker all the time.  Return to neurology clinic in 1 year.  Please let us know if any additional concerns arise in the meantime.

## 2024-10-09 NOTE — NURSING NOTE
"Jacquelin Buckley is a 44 year old female who presents for:  Chief Complaint   Patient presents with    RECHECK     Patient has been dealing with a lot balance/weakness; pt hasn't been active/exercising and wearing her braces at home.          Initial Vitals:  /86   Pulse 89   Wt 97.5 kg (215 lb)   BMI 36.90 kg/m   Estimated body mass index is 36.9 kg/m  as calculated from the following:    Height as of 8/14/20: 1.626 m (5' 4\").    Weight as of this encounter: 97.5 kg (215 lb).. Body surface area is 2.1 meters squared. BP completed using cuff size: wrist cuff    Francisco Freeman  "

## 2024-10-09 NOTE — LETTER
10/9/2024      Jacquelin Buckley  [address] [place] [address]      Dear Colleague,    Thank you for referring your patient, Jacquelin Buckley, to the [facility] [place]. Please see a copy of my visit note below.    ESTABLISHED PATIENT NEUROLOGY NOTE    DATE OF VISIT: 10/9/2024  MRN: [id number]  PATIENT NAME: Jacquelin Buckley  YOB: 1980    Chief Complaint   Patient presents with     RECHECK     Patient has been dealing with a lot balance/weakness; pt hasn't been active/exercising and wearing her braces at home.       SUBJECTIVE:                                                      HISTORY OF PRESENT ILLNESS:  Jacquelin is here for follow up regarding myotonic dystrophy.  I met her as a transfer of care 2 years ago now.    History as previously documented by me (9.16.22):    She is accompanied by a caregiver from her home.  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  She has history of cataract removal and follows with the eye doctor regularly.  She recently had an eyelid lift bilaterally, but did not do well coming out of anesthesia.  She would like to avoid any further surgeries if possible.  She was recently diagnosed with fatty liver for which she has been told she needs to lose weight.  She follows with a GI specialist.  Jacquelin denies any abdominal pain.       She has been walking 10 minutes a day now and will be starting physical therapy next month.  She is also working on eating healthier.  She does have difficulties going up and down stairs so she has a chairlift now.  She says she has new braces.  She was also diagnosed with some arthritis in the knees so she is on medication for this (glucosamine?).  No problems with swallowing. Recent breathing test was reportedly abnormal per caregiver.  Because of this she will be doing an updated sleep study and possibly transition from CPAP to BiPAP.     She continues on bupropion and Effexor for mood.  She  does continue to follow with cardiology.      Our plan was to follow-up in 1 year.  I encouraged Jacquelin to keep up the good work with her exercise and diet plan in the meantime.    Jacquelin is here with her caregiver.  They tell me that she has not been very active lately and they are having trouble getting Jacquelin to wear her AFOs.  She has occasional tingling in the feet if she sits for too long, for instance on the toilet but this dissipates with movement.  She is not really doing much walking.  She does have arthritis in her knees and problems with her hip and was recently diagnosed with prediabetes, on metformin.  Most recent hemoglobin A1c is from April of this year, 5.4%.    She had an eyelid lift that did not really help from a vision standpoint.    CURRENT MEDICATIONS:   Current Outpatient Medications   Medication Sig Dispense Refill     buPROPion (WELLBUTRIN XL) 150 MG 24 hr tablet Take 150 mg by mouth every morning       loratadine (CLARITIN) 10 MG tablet Take 10 mg by mouth daily       METHYLCELLULOSE OP        Misc Natural Products (GLUCOSAMINE-CHONDROITIN SULF PO)        nystatin (MYCOSTATIN) 621566 UNIT/GM external powder        polyethylene glycol (MIRALAX) 17 g packet Take 17 g by mouth daily       simvastatin (ZOCOR) 20 MG tablet Take 20 mg by mouth At Bedtime       venlafaxine (EFFEXOR-ER) 37.5 MG 24 hr tablet Take 37.5 mg by mouth daily       fluticasone (FLONASE) 50 MCG/ACT nasal spray Spray 1 spray into both nostrils daily (Patient not taking: Reported on 10/9/2024)       metFORMIN (GLUCOPHAGE XR) 500 MG 24 hr tablet        No current facility-administered medications for this visit.       RECENT DIAGNOSTIC STUDIES:   Labs: No results found for any visits on 10/09/24.    REVIEW OF SYSTEMS:                                                      10-point review of systems is negative except as mentioned above in HPI.    EXAM:                                                      Physical Exam:   Vitals: BP  127/86   Pulse 89   Wt 97.5 kg (215 lb)   BMI 36.90 kg/m    BMI= Body mass index is 36.9 kg/m .  GENERAL: NAD.  HEENT: NC/AT. Red, dry skin around the eyes.   CV: RRR. S1, S2.   NECK: No bruits.  PULM: Non-labored breathing.   Neurologic:  MENTAL STATUS: Alert, attentive. Speech is slightly dysarthric.  Good comprehension.  Fair concentration.  CRANIAL NERVES: Discs are difficult to visualize as she has very little space between her eyelids.  Visual fields intact to confrontation. Pupils equally, round and reactive to light. Facial plegia. EOM appear full. Hearing intact to conversation. Sternocleidomastoids and trapezius strength intact. Palate moves symmetrically. Tongue midline.  MOTOR: 5/5 in proximal and distal muscle groups of upper and lower extremities except for distally with AFOs in place.   Tone and bulk normal.  Mild  myotonia.  DTRs: Intact and symmetric in biceps, BR. Cannot elicit patellar reflexes.  SENSATION: Normal light touch throughout.  COORDINATION: Slight tremor, otherwise normal finger nose finger. Finger tapping normal.  STATION AND GAIT: Romberg negative.  Casual gait is slightly broad-based.  Right hand-dominant.    ASSESSMENT and PLAN:                                                      Assessment:    ICD-10-CM    1. Progressive Muscular Dystrophy  G71.09 Home Care Referral          Ms. Buckley is a pleasant 44-year-old woman here for follow-up regarding myotonic dystrophy.  The main issue for us is that she has not been as mobile and has been having problems with balance and falls because she does not wear her braces and does not always use her walker.  Besides the dystrophy she has other limitations including musculoskeletal in etiology.  She is also prediabetic now.  She is working with cardiology and sees ophthalmology regularly.  I would like for Jacquelin to get started with some physical therapy at home if possible.  She and her caregiver expressed understanding and agreement  with the plan.    Plan:  I have requested home physical therapy to help you with strengthening, balance/stability.  Please try to wear your braces whenever you are up on your feet and make sure that you are using your walker all the time.  Return to neurology clinic in 1 year.  Please let us know if any additional concerns arise in the meantime.    Total Time: 25 minutes were spent with the patient and in chart review/documentation (as described above in Assessment and Plan)/coordinating the care on date of service.    Esme Colmenares MD  Neurology    Dragon software used in the dictation of this note.                    Again, thank you for allowing me to participate in the care of your patient.        Sincerely,        Esme Colmenares MD

## 2024-10-09 NOTE — PROGRESS NOTES
ESTABLISHED PATIENT NEUROLOGY NOTE    DATE OF VISIT: 10/9/2024  MRN: [id number]  PATIENT NAME: Jacquelin Buckley  YOB: 1980    Chief Complaint   Patient presents with    RECHECK     Patient has been dealing with a lot balance/weakness; pt hasn't been active/exercising and wearing her braces at home.       SUBJECTIVE:                                                      HISTORY OF PRESENT ILLNESS:  Jacquelin is here for follow up regarding myotonic dystrophy.  I met her as a transfer of care 2 years ago now.    History as previously documented by me (9.16.22):    She is accompanied by a caregiver from her home.  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  She has history of cataract removal and follows with the eye doctor regularly.  She recently had an eyelid lift bilaterally, but did not do well coming out of anesthesia.  She would like to avoid any further surgeries if possible.  She was recently diagnosed with fatty liver for which she has been told she needs to lose weight.  She follows with a GI specialist.  Jacquelin denies any abdominal pain.       She has been walking 10 minutes a day now and will be starting physical therapy next month.  She is also working on eating healthier.  She does have difficulties going up and down stairs so she has a chairlift now.  She says she has new braces.  She was also diagnosed with some arthritis in the knees so she is on medication for this (glucosamine?).  No problems with swallowing. Recent breathing test was reportedly abnormal per caregiver.  Because of this she will be doing an updated sleep study and possibly transition from CPAP to BiPAP.     She continues on bupropion and Effexor for mood.  She does continue to follow with cardiology.      Our plan was to follow-up in 1 year.  I encouraged Jacquelin to keep up the good work with her exercise and diet plan in the meantime.    Jacquelin is here with her caregiver.  They tell me that she has  not been very active lately and they are having trouble getting Jacquelin to wear her AFOs.  She has occasional tingling in the feet if she sits for too long, for instance on the toilet but this dissipates with movement.  She is not really doing much walking.  She does have arthritis in her knees and problems with her hip and was recently diagnosed with prediabetes, on metformin.  Most recent hemoglobin A1c is from April of this year, 5.4%.    She had an eyelid lift that did not really help from a vision standpoint.    CURRENT MEDICATIONS:   Current Outpatient Medications   Medication Sig Dispense Refill    buPROPion (WELLBUTRIN XL) 150 MG 24 hr tablet Take 150 mg by mouth every morning      loratadine (CLARITIN) 10 MG tablet Take 10 mg by mouth daily      METHYLCELLULOSE OP       Misc Natural Products (GLUCOSAMINE-CHONDROITIN SULF PO)       nystatin (MYCOSTATIN) 495345 UNIT/GM external powder       polyethylene glycol (MIRALAX) 17 g packet Take 17 g by mouth daily      simvastatin (ZOCOR) 20 MG tablet Take 20 mg by mouth At Bedtime      venlafaxine (EFFEXOR-ER) 37.5 MG 24 hr tablet Take 37.5 mg by mouth daily      fluticasone (FLONASE) 50 MCG/ACT nasal spray Spray 1 spray into both nostrils daily (Patient not taking: Reported on 10/9/2024)      metFORMIN (GLUCOPHAGE XR) 500 MG 24 hr tablet        No current facility-administered medications for this visit.       RECENT DIAGNOSTIC STUDIES:   Labs: No results found for any visits on 10/09/24.    REVIEW OF SYSTEMS:                                                      10-point review of systems is negative except as mentioned above in HPI.    EXAM:                                                      Physical Exam:   Vitals: /86   Pulse 89   Wt 97.5 kg (215 lb)   BMI 36.90 kg/m    BMI= Body mass index is 36.9 kg/m .  GENERAL: NAD.  HEENT: NC/AT. Red, dry skin around the eyes.   CV: RRR. S1, S2.   NECK: No bruits.  PULM: Non-labored breathing.   Neurologic:  MENTAL  STATUS: Alert, attentive. Speech is slightly dysarthric.  Good comprehension.  Fair concentration.  CRANIAL NERVES: Discs are difficult to visualize as she has very little space between her eyelids.  Visual fields intact to confrontation. Pupils equally, round and reactive to light. Facial plegia. EOM appear full. Hearing intact to conversation. Sternocleidomastoids and trapezius strength intact. Palate moves symmetrically. Tongue midline.  MOTOR: 5/5 in proximal and distal muscle groups of upper and lower extremities except for distally with AFOs in place.   Tone and bulk normal.  Mild  myotonia.  DTRs: Intact and symmetric in biceps, BR. Cannot elicit patellar reflexes.  SENSATION: Normal light touch throughout.  COORDINATION: Slight tremor, otherwise normal finger nose finger. Finger tapping normal.  STATION AND GAIT: Romberg negative.  Casual gait is slightly broad-based.  Right hand-dominant.    ASSESSMENT and PLAN:                                                      Assessment:    ICD-10-CM    1. Progressive Muscular Dystrophy  G71.09 Home Care Referral          Ms. Buckley is a pleasant 44-year-old woman here for follow-up regarding myotonic dystrophy.  The main issue for us is that she has not been as mobile and has been having problems with balance and falls because she does not wear her braces and does not always use her walker.  Besides the dystrophy she has other limitations including musculoskeletal in etiology.  She is also prediabetic now.  She is working with cardiology and sees ophthalmology regularly.  I would like for Jacquelin to get started with some physical therapy at home if possible.  She and her caregiver expressed understanding and agreement with the plan.    Plan:  I have requested home physical therapy to help you with strengthening, balance/stability.  Please try to wear your braces whenever you are up on your feet and make sure that you are using your walker all the time.  Return to  neurology clinic in 1 year.  Please let us know if any additional concerns arise in the meantime.    Total Time: 25 minutes were spent with the patient and in chart review/documentation (as described above in Assessment and Plan)/coordinating the care on date of service.    Esme Colmenares MD  Neurology    Dragon software used in the dictation of this note.

## 2024-10-10 ENCOUNTER — TELEPHONE (OUTPATIENT)
Dept: NEUROLOGY | Facility: CLINIC | Age: 44
End: 2024-10-10
Payer: MEDICAID

## 2024-10-10 NOTE — TELEPHONE ENCOUNTER
Spoke with Derek (staff) at [facility] that [facility] is not able to take pt in due to high capacity. Told them to contact patient Insurance to find out home health within her network. Once that's figured out, they can let us know and we'll fax the order to them. Derek confirmed and will pass the message to Stefanie (supervisor).    Francisco 10/10/2024 9:38 AM

## 2024-10-10 NOTE — TELEPHONE ENCOUNTER
Esme Medrano MD Broderick, Bao V  What's next?    Thanks,  [facility]          Previous Messages       ----- Message -----  From: Shemar Mcclendon  Sent: 10/9/2024   2:41 PM CDT  To: Esme Colmenares MD  Subject: Home Health Referral                            Ericka,  This is Gumaro N with [facility]. We received a home health referral for Jacquelin, that we are unable to take due to capacity.  Any questions please call our local office at [phone]

## 2024-12-30 ENCOUNTER — TELEPHONE (OUTPATIENT)
Dept: NEUROLOGY | Facility: CLINIC | Age: 44
End: 2024-12-30
Payer: MEDICAID

## 2024-12-30 NOTE — TELEPHONE ENCOUNTER
Okay to switch from in person to virtual?    Stefanie Ruggiero, [phone], would like to know if the appointment for 10/8/2025 can be a video visit. The visit would be done in a [facility] center. Please review and contact Stefanie to discuss.     Miriam Milligan MA

## (undated) DEVICE — SU PLAIN 6-0 G-1DA 18" 770G

## (undated) DEVICE — SU MERSILENE 5-0 S-24 18" 1764G

## (undated) DEVICE — SU CHROMIC 6-0 G-1 18" 790G

## (undated) DEVICE — SU PLAIN FAST ABSORB 6-0 PC-1 18" 1916G

## (undated) DEVICE — GLOVE PROTEXIS W/NEU-THERA 8.5  2D73TE85

## (undated) DEVICE — SOL WATER IRRIG 1000ML BOTTLE 2F7114

## (undated) DEVICE — ESU PENCIL W/SMOKE EVAC E2515HS

## (undated) DEVICE — SUCTION CANISTER MEDIVAC LINER 3000ML W/LID 65651-530

## (undated) DEVICE — PACK OCULOPLATIC SEN15OCFSD

## (undated) DEVICE — SU ETHILON 6-0 P-1 18" 697G

## (undated) DEVICE — SU VICRYL 5-0 P-3 18" UND J493H

## (undated) DEVICE — SUCTION CANISTER MEDIVAC LINER 1500ML W/LID 65651-515

## (undated) DEVICE — APPLICATOR COTTON TIP 3" PKG OF 10 34831010

## (undated) DEVICE — PEN MARKING SKIN FINE 31145942

## (undated) DEVICE — SU PROLENE 5-0 RB-2DA 30" 8710H

## (undated) DEVICE — NDL ANGIOCATH 20GA 1.25" 4056

## (undated) DEVICE — LINEN TOWEL PACK X5 5464

## (undated) DEVICE — ESU ELEC NDL 1" E1552

## (undated) DEVICE — TUBING SUCTION 12"X1/4" N612

## (undated) DEVICE — EYE PREP BETADINE 5% SOLUTION 30ML 0065-0411-30

## (undated) RX ORDER — DEXAMETHASONE SODIUM PHOSPHATE 4 MG/ML
INJECTION, SOLUTION INTRA-ARTICULAR; INTRALESIONAL; INTRAMUSCULAR; INTRAVENOUS; SOFT TISSUE
Status: DISPENSED
Start: 2020-08-14

## (undated) RX ORDER — PROPOFOL 10 MG/ML
INJECTION, EMULSION INTRAVENOUS
Status: DISPENSED
Start: 2020-08-14

## (undated) RX ORDER — LIDOCAINE HYDROCHLORIDE 20 MG/ML
INJECTION, SOLUTION EPIDURAL; INFILTRATION; INTRACAUDAL; PERINEURAL
Status: DISPENSED
Start: 2020-08-14

## (undated) RX ORDER — FENTANYL CITRATE 50 UG/ML
INJECTION, SOLUTION INTRAMUSCULAR; INTRAVENOUS
Status: DISPENSED
Start: 2020-08-14

## (undated) RX ORDER — ONDANSETRON 2 MG/ML
INJECTION INTRAMUSCULAR; INTRAVENOUS
Status: DISPENSED
Start: 2020-08-14